# Patient Record
Sex: FEMALE | Race: WHITE | NOT HISPANIC OR LATINO | URBAN - METROPOLITAN AREA
[De-identification: names, ages, dates, MRNs, and addresses within clinical notes are randomized per-mention and may not be internally consistent; named-entity substitution may affect disease eponyms.]

---

## 2024-03-25 PROBLEM — E66.9 OBESITY, CHILDHOOD: Status: ACTIVE | Noted: 2024-03-25

## 2024-03-25 PROBLEM — F88 GLOBAL DEVELOPMENTAL DELAY: Status: ACTIVE | Noted: 2024-03-25

## 2024-03-25 PROBLEM — F41.9 ANXIETY: Status: ACTIVE | Noted: 2024-03-25

## 2024-03-25 PROBLEM — F81.9 LEARNING DIFFICULTY: Status: ACTIVE | Noted: 2024-03-25

## 2024-03-25 PROBLEM — F90.9 ADHD (ATTENTION DEFICIT HYPERACTIVITY DISORDER): Status: ACTIVE | Noted: 2024-03-25

## 2024-03-25 PROBLEM — G47.9 SLEEP DISTURBANCE: Status: ACTIVE | Noted: 2024-03-25

## 2024-03-25 PROBLEM — M21.40 PES PLANUS: Status: ACTIVE | Noted: 2024-03-25

## 2024-03-25 PROBLEM — L70.9 ACNE: Status: ACTIVE | Noted: 2024-03-25

## 2024-03-25 PROBLEM — J45.30 ASTHMA, CHRONIC, MILD PERSISTENT, UNCOMPLICATED (HHS-HCC): Status: ACTIVE | Noted: 2024-03-25

## 2024-03-26 ENCOUNTER — OFFICE VISIT (OUTPATIENT)
Dept: PEDIATRICS | Facility: CLINIC | Age: 19
End: 2024-03-26
Payer: COMMERCIAL

## 2024-03-26 VITALS
DIASTOLIC BLOOD PRESSURE: 80 MMHG | WEIGHT: 247.7 LBS | HEIGHT: 62 IN | BODY MASS INDEX: 45.58 KG/M2 | SYSTOLIC BLOOD PRESSURE: 114 MMHG | RESPIRATION RATE: 18 BRPM | TEMPERATURE: 98.1 F | HEART RATE: 95 BPM

## 2024-03-26 DIAGNOSIS — Z00.129 ENCOUNTER FOR ROUTINE CHILD HEALTH EXAMINATION WITHOUT ABNORMAL FINDINGS: Primary | ICD-10-CM

## 2024-03-26 DIAGNOSIS — Z00.00 HEALTH CARE MAINTENANCE: ICD-10-CM

## 2024-03-26 LAB
POC APPEARANCE, URINE: CLEAR
POC BILIRUBIN, URINE: NEGATIVE
POC BLOOD, URINE: NEGATIVE
POC COLOR, URINE: YELLOW
POC GLUCOSE, URINE: NEGATIVE MG/DL
POC HEMOGLOBIN: 14.6 G/DL (ref 12–16)
POC KETONES, URINE: NEGATIVE MG/DL
POC LEUKOCYTES, URINE: NEGATIVE
POC NITRITE,URINE: NEGATIVE
POC PH, URINE: 5.5 PH
POC PROTEIN, URINE: NEGATIVE MG/DL
POC SPECIFIC GRAVITY, URINE: >=1.03
POC UROBILINOGEN, URINE: 0.2 EU/DL

## 2024-03-26 PROCEDURE — 1036F TOBACCO NON-USER: CPT | Performed by: PEDIATRICS

## 2024-03-26 PROCEDURE — 96127 BRIEF EMOTIONAL/BEHAV ASSMT: CPT | Performed by: PEDIATRICS

## 2024-03-26 PROCEDURE — 81003 URINALYSIS AUTO W/O SCOPE: CPT | Performed by: PEDIATRICS

## 2024-03-26 PROCEDURE — 99395 PREV VISIT EST AGE 18-39: CPT | Performed by: PEDIATRICS

## 2024-03-26 PROCEDURE — 85018 HEMOGLOBIN: CPT | Performed by: PEDIATRICS

## 2024-03-26 NOTE — PROGRESS NOTES
Subjective   Patient ID: Capri Gracia is a 18 y.o. female who presents for Well Child (With mom). Feet are always hurting after work.   Works at [employer] from 6 am -2 pm   She never know her schedule a head of time but works full time according to her     She enjoys work     Does not drive yet but wants to     Feels anxious at times and gets worries when she disappoints people like her parents           Review of Systems    Objective   Physical Exam  Constitutional:       Appearance: Normal appearance. She is obese.   HENT:      Right Ear: Tympanic membrane normal.      Left Ear: Tympanic membrane normal.      Nose: Nose normal.      Mouth/Throat:      Pharynx: Oropharynx is clear.   Eyes:      Extraocular Movements: Extraocular movements intact.      Conjunctiva/sclera: Conjunctivae normal.      Pupils: Pupils are equal, round, and reactive to light.   Cardiovascular:      Rate and Rhythm: Regular rhythm.      Heart sounds: Normal heart sounds.   Pulmonary:      Breath sounds: Normal breath sounds.   Abdominal:      General: Abdomen is flat. Bowel sounds are normal.      Palpations: Abdomen is soft.   Musculoskeletal:      Cervical back: Neck supple.   Skin:     General: Skin is warm.   Neurological:      General: No focal deficit present.      Mental Status: She is alert.   Psychiatric:         Mood and Affect: Mood normal.         Assessment/Plan   Diagnoses and all orders for this visit:  Encounter for routine child health examination without abnormal findings  Health care maintenance  -     POCT hemoglobin manually resulted  -     POCT UA Automated manually resulted  Spent time discussing her mood and at times she feels sad mostly situational            Shantal Molina MA 03/26/24 2:43 PM

## 2024-09-11 ENCOUNTER — TELEMEDICINE (OUTPATIENT)
Dept: PEDIATRICS | Facility: CLINIC | Age: 19
End: 2024-09-11
Payer: COMMERCIAL

## 2024-09-11 DIAGNOSIS — U07.1 COVID-19 VIRUS DETECTED: Primary | ICD-10-CM

## 2024-09-11 PROCEDURE — 99213 OFFICE O/P EST LOW 20 MIN: CPT | Performed by: PEDIATRICS

## 2024-09-11 NOTE — PROGRESS NOTES
Subjective   Patient ID: Capri Gracia is a 19 y.o. female who presents for No chief complaint on file..  Today she is accompanied by alone.     1 day h/o runny nose, congestion and sore throat   No fever   No GI sx   Normal appetite   Tested positive for COVID yesterday     Mom had COVID 1st         Review of Systems    Objective   There were no vitals taken for this visit.  BSA: There is no height or weight on file to calculate BSA.  Growth percentiles: Facility age limit for growth %aliyah is 20 years. Facility age limit for growth %aliyah is 20 years.     Physical Exam  Constitutional:       Appearance: Normal appearance.         Assessment/Plan   Positive COVID URI  Cont supportive care   Does not need to quarantine  Does not need work excuse

## 2024-11-25 ENCOUNTER — APPOINTMENT (OUTPATIENT)
Dept: PEDIATRICS | Facility: CLINIC | Age: 19
End: 2024-11-25
Payer: COMMERCIAL

## 2025-02-24 ENCOUNTER — OFFICE VISIT (OUTPATIENT)
Facility: CLINIC | Age: 20
End: 2025-02-24
Payer: COMMERCIAL

## 2025-02-24 VITALS
WEIGHT: 255 LBS | TEMPERATURE: 101.1 F | HEART RATE: 128 BPM | HEIGHT: 63 IN | OXYGEN SATURATION: 95 % | BODY MASS INDEX: 45.18 KG/M2 | SYSTOLIC BLOOD PRESSURE: 109 MMHG | DIASTOLIC BLOOD PRESSURE: 75 MMHG | RESPIRATION RATE: 20 BRPM

## 2025-02-24 DIAGNOSIS — J45.40 MODERATE PERSISTENT ASTHMA WITHOUT COMPLICATION (HHS-HCC): ICD-10-CM

## 2025-02-24 DIAGNOSIS — J45.40 MODERATE PERSISTENT ASTHMA WITHOUT COMPLICATION (HHS-HCC): Primary | ICD-10-CM

## 2025-02-24 DIAGNOSIS — J11.1 INFLUENZA: ICD-10-CM

## 2025-02-24 DIAGNOSIS — R50.81 FEVER IN OTHER DISEASES: Primary | ICD-10-CM

## 2025-02-24 PROCEDURE — 99204 OFFICE O/P NEW MOD 45 MIN: CPT | Performed by: NURSE PRACTITIONER

## 2025-02-24 PROCEDURE — 1036F TOBACCO NON-USER: CPT | Performed by: NURSE PRACTITIONER

## 2025-02-24 PROCEDURE — 3008F BODY MASS INDEX DOCD: CPT | Performed by: NURSE PRACTITIONER

## 2025-02-24 RX ORDER — OSELTAMIVIR PHOSPHATE 75 MG/1
75 CAPSULE ORAL EVERY 12 HOURS
Qty: 10 CAPSULE | Refills: 0 | Status: SHIPPED | OUTPATIENT
Start: 2025-02-24 | End: 2025-03-01

## 2025-02-24 RX ORDER — FLUTICASONE PROPIONATE AND SALMETEROL 250; 50 UG/1; UG/1
1 POWDER RESPIRATORY (INHALATION) 2 TIMES DAILY
Qty: 1 EACH | Refills: 3 | Status: SHIPPED | OUTPATIENT
Start: 2025-02-24 | End: 2025-03-26

## 2025-02-24 RX ORDER — BENZONATATE 200 MG/1
200 CAPSULE ORAL 3 TIMES DAILY PRN
Qty: 30 CAPSULE | Refills: 0 | Status: SHIPPED | OUTPATIENT
Start: 2025-02-24 | End: 2025-03-26

## 2025-02-24 RX ORDER — ALBUTEROL SULFATE 90 UG/1
2 INHALANT RESPIRATORY (INHALATION) EVERY 4 HOURS PRN
Qty: 18 G | Refills: 5 | Status: SHIPPED | OUTPATIENT
Start: 2025-02-24 | End: 2026-02-24

## 2025-02-24 RX ORDER — MOMETASONE FUROATE AND FORMOTEROL FUMARATE DIHYDRATE 100; 5 UG/1; UG/1
2 AEROSOL RESPIRATORY (INHALATION) 2 TIMES DAILY
Qty: 13 G | Refills: 3 | Status: SHIPPED | OUTPATIENT
Start: 2025-02-24 | End: 2025-02-25

## 2025-02-24 RX ORDER — MONTELUKAST SODIUM 10 MG/1
10 TABLET ORAL NIGHTLY
Qty: 30 TABLET | Refills: 5 | Status: SHIPPED | OUTPATIENT
Start: 2025-02-24 | End: 2025-08-23

## 2025-02-24 RX ORDER — BENZONATATE 100 MG/1
100 CAPSULE ORAL EVERY 8 HOURS PRN
COMMUNITY
Start: 2025-02-23 | End: 2025-02-24

## 2025-02-24 ASSESSMENT — ENCOUNTER SYMPTOMS
COUGH: 1
SHORTNESS OF BREATH: 1
RHINORRHEA: 1
FEVER: 1

## 2025-02-24 NOTE — PATIENT INSTRUCTIONS
Asthma with influenza A  - if she starts wheezing will get CXR and course of steriods  - albuterol hfa 2 puffs or albuterol nebs every 4-6 hours as needed  - start ICA/LABA - dulera 100 2 puffs twice a day , rinse and spit afterwards  - tessalon Perles 200mg by mouth every 8 hours as needed  - push fluids  - avoid contact as feverish, return to work once afebrile for 24 hours   - add guaifenesin 600mg twice a day   - add tamiflu twice a day x 5 days      Snoring, hypersomnulent Stop bang 2   - re-eval once better consider     allergic rhinitis  - purchase nasal saline over the counter - use 2-3 x per day to rinse out nasal passages and keep them moist   - start fluticasone (flonase) 1 spray each nostril 1-2 x per day - remember to aim towards your ear   - cont Claritin daily at nighttime       Thank you for visiting the Pulmonary clinic today!       Return to clinic after 4-6 weeks and after PFTs, CT scan complete  or sooner if needed   Fiordaliza Wong CNP  My office number is [phone] -     Call to schedule  for radiology - CT scans/PFTs etc at  [phone]  General scheduling  [phone]     Best way to get a hold of me is to call my office --> Please do not send me follow my health messages  Any test results will be discussed at next visit -- please make sure to make a follow up appt after testing.

## 2025-02-24 NOTE — PROGRESS NOTES
Patient: Capri Gracia    [id number]  : 2005 -- AGE 19 y.o.    Provider: TIARA Valle     Location [facility]   Service Date: 2025              [facility]  New Visit Note      HISTORY OF PRESENT ILLNESS     The patient's referring provider is: No ref. provider found    HISTORY OF PRESENT ILLNESS   Capri Gracia is a 19 y.o. female who presents to a [facility] for an evaluation with concerns of Shortness of Breath. I have independently interviewed and examined the patient in the office and reviewed available records.    Initial History  She was tested with FLU And COVID yesterday at Urgent care   Her mother has influenza A     On today's visit, the patient reports she reports sore throat coughing since .  She developed a fever 100.4. She is taking ibuprofen/tylenol and dayquil and nyquil.  She is shortness of breath, difficult to fall asleep due to cough. She is expectorating yellow phlegm. She is short of breath at rest. Has a history of asthma - was admitted in  for rhinivirus - last seen by Dr. Grajeda at that point. She was  previously on albuterol.   At baseline - she is dyspneic with strenuous activity   Currently she  is unable to lay down, denies  pnd/ brenda.  Weight has been mostly stable.  Prior to illness having dry cough, c/o wheezing. Has a runny nose, and a tingling sensation in the back of his throat - claritin.  . Has no runny nose, or a tingling sensation in the back of his throat. Denies chest pain or heartburn.     Previous pulmonary history:     previously was told they have asthma as a child since 5 years old - has been admitted  Never been to pulmonary rehab. Does not recall having AECOPD requiring antibiotics or prednisone.    Inhalers/nebulized medications: albuterol and mother thinks Symbicort     Hospitalization History: Not been hospitalized over the last year for  breathing related problem.    Sleep history:    Complains of snoring, apnea, feeling tired during the day, and taking naps during the day.   STOP-BANG score of 2 low     ALLERGIES AND MEDICATIONS     ALLERGIES  Allergies   Allergen Reactions    Cefdinir Hives       MEDICATIONS  Current Outpatient Medications   Medication Sig Dispense Refill    benzocaine-menthoL 6-10 mg lozenge Place 1 lozenge into mouth between cheek and gum every 2 hours if needed.      benzonatate (Tessalon) 100 mg capsule Take 1 capsule (100 mg) by mouth every 8 hours if needed.       No current facility-administered medications for this visit.         PAST HISTORY     PAST MEDICAL HISTORY  She  has a past medical history of Abnormal findings on diagnostic imaging of heart and coronary circulation (08/18/2021), Acute upper respiratory infection, unspecified (03/02/2020), Other viral infections of unspecified site (08/18/2021), Personal history of other diseases of the respiratory system (03/02/2020), Personal history of other diseases of the respiratory system (07/07/2021), Personal history of other diseases of the respiratory system (08/18/2021), Personal history of other diseases of the respiratory system (10/09/2019), Personal history of other specified conditions (03/02/2020), Personal history of other specified conditions, and Personal history of pneumonia (recurrent) (02/15/2022).  Asthma  Acid reflux - not officially diagnosed      PAST SURGICAL HISTORY  Past Surgical History:   Procedure Laterality Date    OTHER SURGICAL HISTORY  10/02/2019    No history of surgery       IMMUNIZATION HISTORY  Immunization History   Administered Date(s) Administered    DTaP vaccine, pediatric  (INFANRIX) 2005, 2004, 2004, 08/29/2005, 12/14/2009    Flu vaccine (IIV4), preservative free *Check age/dose* 10/30/2017    HPV, Unspecified 03/11/2021, 04/29/2022, 08/30/2022    Hepatitis B vaccine, 19 yrs and under (RECOMBIVAX, ENGERIX)  2004, 2004, 2004    HiB PRP-T conjugate vaccine (HIBERIX, ACTHIB) 2004, 2004, 08/29/2005    HiB, unspecified 2004    Influenza, seasonal, injectable 2004, 2004, 09/14/2009, 11/03/2009, 01/21/2010, 10/14/2011, 11/13/2012, 11/01/2013, 10/12/2015, 10/09/2019, 10/15/2020    MMR vaccine, subcutaneous (MMR II) 05/09/2005, 12/14/2009    Meningococcal ACWY vaccine (MENVEO) 08/30/2022    Meningococcal ACWY, unspecified 07/26/2017    Pfizer Purple Cap SARS-CoV-2 06/20/2021, 07/13/2021    Pneumococcal conjugate vaccine, 13-valent (PREVNAR 13) 2004, 2004, 2004, 08/29/2005    Poliovirus vaccine, subcutaneous (IPOL) 2004, 2004, 08/29/2005, 12/14/2009    Tdap vaccine, age 7 year and older (BOOSTRIX, ADACEL) 07/26/2017    Varicella vaccine, subcutaneous (VARIVAX) 05/09/2005, 12/14/2009       SOCIAL HISTORY  She  reports that she has never smoked. She has never been exposed to tobacco smoke. She has never used smokeless tobacco. She reports that she does not drink alcohol and does not use drugs. She Patient     OCCUPATIONAL/ENVIRONMENTAL HISTORY  Currently works as: works at [employer]   DOES/DOES NOT EC: does not have known exposure to asbestos, silica, beryllium or inhaled metals.  DOES/DOES NOT EC: does not have exposure to birds or exotic animals.    FAMILY HISTORY  No family history on file.  DOES/DOES NOT EC: does have a family history of pulmonary disease. Mother is prone to pneumonia   DOES/DOES NOT EC: does have a family history of cancer. Maternal aunt had leukemia  DOES/DOES NOT EC: does not have a family history of autoimmune disorders.    RESULTS/DATA     Pulmonary Function Test Results      PFT 8/19/2021            Chest Radiograph     XR CHEST 1 VIEW 06/24/2021    Narrative  MRN: [id number]  Patient Name: NABIL ROSARIO    STUDY:  Chest dated  6/24/2021.    INDICATION:  shortness of breath    COMPARISON:  None.    ACCESSION  NUMBER(S):  [id number]    ORDERING CLINICIAN:  VALERIE SPEAR    TECHNIQUE:  AP upright portable radiograph of the chest.    FINDINGS:  The lungs are clear.  No pneumothorax or effusion is evident. The  cardiomediastinal silhouette is  not enlarged.  The soft tissues are  grossly unremarkable.    Impression  No acute cardiopulmonary process is evident.      Chest CT Scan     CT ANGIO CHEST FOR PE;  6/24/2021        INDICATION:  tachycardia, hypoxia, elevated d dimer.     COMPARISON:  None.     ACCESSION NUMBER(S):  [id number]     ORDERING CLINICIAN:  VALERIE SPEAR     TECHNIQUE:  Contiguous axial images of the chest were obtained after the  intravenous administration of  90 mL of Isovue-300. Coronal and  sagittal reformatted images were obtained from the axial images. MIPS  were also performed and reviewed and confirmed the findings of the  examination.     FINDINGS:  No axillary, mediastinal, or hilar lymphadenopathy.     The heart is normal in size. No significant pericardial effusion.     No definite evidence of acute central, main, or proximal lobar  pulmonary embolism. Evaluation for more distal emboli is  nondiagnostic secondary to patient respiratory motion.     Evaluation of the lungs is limited secondary to respiratory motion.  There are bilateral groundglass opacities in the upper lobes and also  opacities in the right middle lobe and lingula. There is also minimal  ground-glass opacities in the left lower lobe. No significant pleural  effusion. No pneumothorax.     Limited evaluation of the upper abdomen.  Hepatic steatosis.     No evidence of acute fracture of the thoracic spine.     IMPRESSION:  No definite evidence of acute central, main, or proximal lobar  pulmonary embolism. Evaluation for more distal emboli is  nondiagnostic secondary to patient respiratory motion.     Bilateral groundglass and consolidative opacities compatible with  pneumonia and highly suggestive ofCOVID-19.      Echocardiogram  "    No testing done          REVIEW OF SYSTEMS     REVIEW OF SYSTEMS  Review of Systems   Constitutional:  Positive for fever.   HENT:  Positive for congestion, postnasal drip and rhinorrhea.    Respiratory:  Positive for cough and shortness of breath.          PHYSICAL EXAM     VITAL SIGNS: /75   Pulse (!) 128   Temp (!) 38.4 °C (101.1 °F) (Temporal)   Resp 20   Ht 1.6 m (5' 3\")   Wt 116 kg (255 lb)   SpO2 95%   BMI 45.17 kg/m²      CURRENT WEIGHT: [unfilled]  BMI: [unfilled]  PREVIOUS WEIGHTS:  Wt Readings from Last 3 Encounters:   02/24/25 116 kg (255 lb)   03/26/24 112 kg (247 lb 11.2 oz) (>99%, Z= 2.50)*   08/30/22 109 kg (239 lb 9.6 oz) (>99%, Z= 2.38)*     * Growth percentiles are based on [facility] (Girls, 2-20 Years) data.       Physical Exam  Constitutional:       General: She is in acute distress.      Appearance: Normal appearance. She is ill-appearing.   HENT:      Head: Normocephalic and atraumatic.      Right Ear: External ear normal.      Left Ear: External ear normal.      Nose: Nose normal.      Mouth/Throat:      Mouth: Mucous membranes are moist.      Pharynx: Oropharynx is clear.   Eyes:      Extraocular Movements: Extraocular movements intact.      Conjunctiva/sclera: Conjunctivae normal.      Pupils: Pupils are equal, round, and reactive to light.   Cardiovascular:      Rate and Rhythm: Normal rate and regular rhythm.      Pulses: Normal pulses.      Heart sounds: Normal heart sounds.   Pulmonary:      Effort: Pulmonary effort is normal.      Breath sounds: Normal breath sounds.      Comments: Audible cough   Abdominal:      General: Bowel sounds are normal.      Palpations: Abdomen is soft.   Musculoskeletal:         General: Normal range of motion.      Cervical back: Normal range of motion and neck supple.   Skin:     General: Skin is warm and dry.   Neurological:      General: No focal deficit present.      Mental Status: She is alert and oriented to person, place, and time. Mental " status is at baseline.   Psychiatric:         Mood and Affect: Mood normal.         Behavior: Behavior normal.         Thought Content: Thought content normal.         Judgment: Judgment normal.         ASSESSMENT/PLAN     Ms. Gracia is a 19 y.o. female and  has a past medical history of Abnormal findings on diagnostic imaging of heart and coronary circulation (08/18/2021), Acute upper respiratory infection, unspecified (03/02/2020), Other viral infections of unspecified site (08/18/2021), Personal history of other diseases of the respiratory system (03/02/2020), Personal history of other diseases of the respiratory system (07/07/2021), Personal history of other diseases of the respiratory system (08/18/2021), Personal history of other diseases of the respiratory system (10/09/2019), Personal history of other specified conditions (03/02/2020), Personal history of other specified conditions, and Personal history of pneumonia (recurrent) (02/15/2022). She was referred to the [facility] for evaluation of asthma, cough and influenza     Problem List and Orders      Assessment and Plan / Recommendations:  Problem List Items Addressed This Visit    None        Asthma with influenza A  - if she starts wheezing will get CXR and course of steriods  - albuterol hfa 2 puffs or albuterol nebs every 4-6 hours as needed  - start ICA/LABA - dulera 100 2 puffs twice a day , rinse and spit afterwards  - tessalon Perles 200mg by mouth every 8 hours as needed  - push fluids  - avoid contact as feverish, return to work once afebrile for 24 hours   - add guaifenesin 600mg twice a day   - add tamiflu twice a day x 5 days      Snoring, hyper-somnulent Stop bang 2   - re-eval once better consider     allergic rhinitis  - purchase nasal saline over the counter - use 2-3 x per day to rinse out nasal passages and keep them moist   - start fluticasone (flonase) 1 spray each nostril 1-2 x per day - remember  to aim towards your ear   - cont Claritin daily at nighttime       Vaccinations  - recommend pneumonia, RSV and yearly influenza       Thank you for visiting the Pulmonary clinic today!       Return to clinic after 4-6 weeks and after PFTs, CT scan complete  or sooner if needed   Fiordaliza Wong CNP  My office number is [phone] -     Call to schedule  for radiology - CT scans/PFTs etc at  [phone]  General scheduling  [phone]     Best way to get a hold of me is to call my office --> Please do not send me follow my health messages  Any test results will be discussed at next visit -- please make sure to make a follow up appt after testing.

## 2025-02-25 RX ORDER — FLUTICASONE PROPIONATE AND SALMETEROL 113; 14 UG/1; UG/1
1 POWDER, METERED RESPIRATORY (INHALATION) 2 TIMES DAILY
Qty: 1 EACH | Refills: 3 | Status: SHIPPED | OUTPATIENT
Start: 2025-02-25 | End: 2025-03-27

## 2025-03-06 ENCOUNTER — TELEPHONE (OUTPATIENT)
Facility: CLINIC | Age: 20
End: 2025-03-06
Payer: COMMERCIAL

## 2025-03-24 ENCOUNTER — APPOINTMENT (OUTPATIENT)
Facility: CLINIC | Age: 20
End: 2025-03-24
Payer: COMMERCIAL

## 2025-04-04 NOTE — PROGRESS NOTES
Patient: Capri Gracia    [id number]  : 2005 -- AGE 19 y.o.    Provider: TIARA Valle     Location [facility]   Service Date: 4/15/2025              [facility]  New Visit Note      HISTORY OF PRESENT ILLNESS     The patient's referring provider is: No ref. provider found    HISTORY OF PRESENT ILLNESS   Capri Gracia is a 19 y.o. female who presents to a [facility] for an evaluation with concerns of Asthma. I have independently interviewed and examined the patient in the office and reviewed available records.    Initial History  She was tested with FLU And COVID yesterday at Urgent care   Her mother has influenza A     On today's visit, the patient reports she reports sore throat coughing since .  She developed a fever 100.4. She is taking ibuprofen/tylenol and dayquil and nyquil.  She is shortness of breath, difficult to fall asleep due to cough. She is expectorating yellow phlegm. She is short of breath at rest. Has a history of asthma - was admitted in  for rhinivirus - last seen by Dr. Grajeda at that point. She was  previously on albuterol.   At baseline - she is dyspneic with strenuous activity   Currently she  is unable to lay down, denies  pnd/ brenda.  Weight has been mostly stable.  Prior to illness having dry cough, c/o wheezing. Has a runny nose, and a tingling sensation in the back of his throat - claritin.  . Has no runny nose, or a tingling sensation in the back of his throat. Denies chest pain or heartburn.     Previous pulmonary history:     previously was told they have asthma as a child since 5 years old - has been admitted  Never been to pulmonary rehab. Does not recall having AECOPD requiring antibiotics or prednisone.    Inhalers/nebulized medications: albuterol and mother thinks Symbicort     Hospitalization History: Not been hospitalized over the last year for breathing  related problem.    Sleep history:    Complains of snoring, apnea, feeling tired during the day, and taking naps during the day.   STOP-BANG score of 2 low     4/15/2025      On today's visit, the patient reports feeling better. She is using rescue inhaler a couple times a month. Denies coughing, a little wheezing. No Phlegm production  No fever or chills/   No Shortness of breath. NO runny nose or PND   Using her ICS/LABA and rinsing afterwards   ACT 20     ALLERGIES AND MEDICATIONS     ALLERGIES  Allergies   Allergen Reactions    Cefdinir Hives       MEDICATIONS  Current Outpatient Medications   Medication Sig Dispense Refill    albuterol (Ventolin HFA) 90 mcg/actuation inhaler Inhale 2 puffs every 4 hours if needed for wheezing or shortness of breath. 18 g 5    benzocaine-menthoL 6-10 mg lozenge Place 1 lozenge into mouth between cheek and gum every 2 hours if needed.      montelukast (Singulair) 10 mg tablet Take 1 tablet (10 mg) by mouth once daily at bedtime. 30 tablet 5    fluticasone propion-salmeteroL (AirDuo RespiClick) 113-14 mcg/actuation inhaler Inhale 1 puff 2 times a day. 1 each 3    fluticasone propion-salmeteroL (Wixela Inhub) 250-50 mcg/dose diskus inhaler Inhale 1 puff 2 times a day. Rinse mouth with water after use to reduce aftertaste and incidence of candidiasis. Do not swallow. 1 each 3     No current facility-administered medications for this visit.         PAST HISTORY     PAST MEDICAL HISTORY  She  has a past medical history of Abnormal findings on diagnostic imaging of heart and coronary circulation (08/18/2021), Acute upper respiratory infection, unspecified (03/02/2020), Other viral infections of unspecified site (08/18/2021), Personal history of other diseases of the respiratory system (03/02/2020), Personal history of other diseases of the respiratory system (07/07/2021), Personal history of other diseases of the respiratory system (08/18/2021), Personal history of other diseases of  the respiratory system (10/09/2019), Personal history of other specified conditions (03/02/2020), Personal history of other specified conditions, and Personal history of pneumonia (recurrent) (02/15/2022).  Asthma  Acid reflux - not officially diagnosed      PAST SURGICAL HISTORY  Past Surgical History:   Procedure Laterality Date    OTHER SURGICAL HISTORY  10/02/2019    No history of surgery       IMMUNIZATION HISTORY  Immunization History   Administered Date(s) Administered    DTaP vaccine, pediatric  (INFANRIX) 2005, 2004, 2004, 08/29/2005, 12/14/2009    Flu vaccine (IIV4), preservative free *Check age/dose* 10/30/2017    HPV, Unspecified 03/11/2021, 04/29/2022, 08/30/2022    Hepatitis B vaccine, 19 yrs and under (RECOMBIVAX, ENGERIX) 2004, 2004, 2004    HiB PRP-T conjugate vaccine (HIBERIX, ACTHIB) 2004, 2004, 08/29/2005    HiB, unspecified 2004    Influenza, seasonal, injectable 2004, 2004, 09/14/2009, 11/03/2009, 01/21/2010, 10/14/2011, 11/13/2012, 11/01/2013, 10/12/2015, 10/09/2019, 10/15/2020    MMR vaccine, subcutaneous (MMR II) 05/09/2005, 12/14/2009    Meningococcal ACWY vaccine (MENVEO) 08/30/2022    Meningococcal ACWY, unspecified 07/26/2017    Pfizer Purple Cap SARS-CoV-2 06/20/2021, 07/13/2021    Pneumococcal conjugate vaccine, 13-valent (PREVNAR 13) 2004, 2004, 2004, 08/29/2005    Poliovirus vaccine, subcutaneous (IPOL) 2004, 2004, 08/29/2005, 12/14/2009    Tdap vaccine, age 7 year and older (BOOSTRIX, ADACEL) 07/26/2017    Varicella vaccine, subcutaneous (VARIVAX) 05/09/2005, 12/14/2009       SOCIAL HISTORY  She  reports that she has never smoked. She has never been exposed to tobacco smoke. She has never used smokeless tobacco. She reports that she does not drink alcohol and does not use drugs. She Patient     OCCUPATIONAL/ENVIRONMENTAL HISTORY  Currently works as: works at [employer]   DOES/DOES NOT  EC: does not have known exposure to asbestos, silica, beryllium or inhaled metals.  DOES/DOES NOT EC: does not have exposure to birds or exotic animals.    FAMILY HISTORY  No family history on file.  DOES/DOES NOT EC: does have a family history of pulmonary disease. Mother is prone to pneumonia   DOES/DOES NOT EC: does have a family history of cancer. Maternal aunt had leukemia  DOES/DOES NOT EC: does not have a family history of autoimmune disorders.    RESULTS/DATA     Pulmonary Function Test Results      PFT 8/19/2021            Chest Radiograph     XR CHEST 1 VIEW 06/24/2021    Narrative  MRN: [id number]  Patient Name: NABIL ROSARIO    STUDY:  Chest dated  6/24/2021.    INDICATION:  shortness of breath    COMPARISON:  None.    ACCESSION NUMBER(S):  [id number]    ORDERING CLINICIAN:  VALERIE SPEAR    TECHNIQUE:  AP upright portable radiograph of the chest.    FINDINGS:  The lungs are clear.  No pneumothorax or effusion is evident. The  cardiomediastinal silhouette is  not enlarged.  The soft tissues are  grossly unremarkable.    Impression  No acute cardiopulmonary process is evident.      Chest CT Scan     CT ANGIO CHEST FOR PE;  6/24/2021        INDICATION:  tachycardia, hypoxia, elevated d dimer.     COMPARISON:  None.     ACCESSION NUMBER(S):  [id number]     ORDERING CLINICIAN:  VALERIE SPEAR     TECHNIQUE:  Contiguous axial images of the chest were obtained after the  intravenous administration of  90 mL of Isovue-300. Coronal and  sagittal reformatted images were obtained from the axial images. MIPS  were also performed and reviewed and confirmed the findings of the  examination.     FINDINGS:  No axillary, mediastinal, or hilar lymphadenopathy.     The heart is normal in size. No significant pericardial effusion.     No definite evidence of acute central, main, or proximal lobar  pulmonary embolism. Evaluation for more distal emboli is  nondiagnostic secondary to patient respiratory motion.     Evaluation of  the lungs is limited secondary to respiratory motion.  There are bilateral groundglass opacities in the upper lobes and also  opacities in the right middle lobe and lingula. There is also minimal  ground-glass opacities in the left lower lobe. No significant pleural  effusion. No pneumothorax.     Limited evaluation of the upper abdomen.  Hepatic steatosis.     No evidence of acute fracture of the thoracic spine.     IMPRESSION:  No definite evidence of acute central, main, or proximal lobar  pulmonary embolism. Evaluation for more distal emboli is  nondiagnostic secondary to patient respiratory motion.     Bilateral groundglass and consolidative opacities compatible with  pneumonia and highly suggestive ofCOVID-19.      Echocardiogram     Echocardiogram 6/25/2021       Complete echocardiogram examination with two-dimensional imaging, M-mode, color-Doppler, and spectral Doppler was performed.      1. Technically difficult and limited study.   2. The left ventricle does not appear enlarged and the systolic function is qualitatively hyperdynamic.   3. Limited coronary artery imaging. The proximal right coronary artery does not appear enlarged. Left coronary artery was not well seen.   4. The right ventricle does not appear enlarged. Limited imaging of the RV free wall with inability to comment on systolic function.   5. Atrial septum not adequately visualized.   6. The pulmonary veins were not well visualized.   7. No suprasternal notch imaging.   8. No pericardial effusion.     Segmental Anatomy, Cardiac Position and Situs:  . The heart position is within the left hemithorax.  Systemic Veins:  The systemic veins were not evaluated.  Pulmonary Veins:  The pulmonary veins were not well visualized.  Atria:     Atrial septum not adequately visualized. The right atrium is normal in size. The left atrium is normal in size.  Mitral Valve:  The mitral valve is normal. There is no mitral valve regurgitation.  Tricuspid  Valve:  The tricuspid valve is not well visualized.  Left Ventricle:     The left ventricle does not appear enlarged and the systolic function is qualitatively hyperdynamic.  Right Ventricle:  The right ventricle does not appear enlarged. Limited imaging of the RV free wall with inability to comment on systolic function.  Ventricular Septum:  No ventricular septal defect is seen.  Aortic Valve:  The aortic valve is normal. Normal aortic valve Doppler pattern. There is no aortic valve regurgitation. Small hyperechoic area on the non-coronary cusp of the aortic valve.  Left Ventricular Outflow Tract:  There is no left ventricular outflow tract obstruction.  Pulmonary Valve:  The pulmonic valve was not well visualized. There is no pulmonary valve stenosis. There is trace pulmonary valve regurgitation.  Right Ventricular Outflow Tract:  There is no right ventricular outflow tract obstruction.  Aorta:  The aortic root is normal in size. No suprasternal notch imaging. The ascending aorta is not well visualized.  Pulmonary Arteries:     The pulmonary arteries were not well visualized.  Ductus Arteriosus:  The ductus arteriosus was not evaluated.  Coronary Arteries:  The right coronary artery origin appears normal. Limited coronary artery imaging. The proximal right coronary artery does not appear enlarged. Left coronary artery was not well seen.  Pericardium:  There is no pericardial effusion.  Other:  Technically difficult and limited study.     2D measurements                Z-score  Aortic Valve Annulus:  1.95 cm -1.75  Aorta Root s:          2.55 cm -1.63  Right Coronary Artery:  2.6 mm -1.45        Aorta-Aortic Valve Doppler  Peak velocity: 1.06 m/sec  Peak gradient: 4.46 mmHg        Pulmonary Valve Doppler  Peak velocity: 1.01 m/sec  Peak gradient: 4.07 mmHg        Pulmonary Arteries Doppler  LPA peak velocity: 0.77 m/s  LPA peak gradient: 2.37 mmHg  RPA peak velocity: 0.77 m/s  RPA peak gradient: 2.35 mmHg       "      REVIEW OF SYSTEMS     REVIEW OF SYSTEMS  Review of Systems   Constitutional:  Negative for fever.   HENT:  Negative for congestion, postnasal drip and rhinorrhea.    Respiratory:  Negative for cough and shortness of breath.    All other systems reviewed and are negative.        PHYSICAL EXAM     VITAL SIGNS: /80   Pulse 87   Resp 18   Ht 1.6 m (5' 3\")   Wt 116 kg (255 lb 3.2 oz)   LMP 04/08/2025 (Approximate)   SpO2 96%   BMI 45.21 kg/m²      CURRENT WEIGHT: [unfilled]  BMI: [unfilled]  PREVIOUS WEIGHTS:  Wt Readings from Last 3 Encounters:   04/15/25 116 kg (255 lb 3.2 oz)   02/24/25 116 kg (255 lb)   03/26/24 112 kg (247 lb 11.2 oz) (>99%, Z= 2.50)*     * Growth percentiles are based on CDC (Girls, 2-20 Years) data.       Physical Exam  Constitutional:       General: She is not in acute distress.     Appearance: Normal appearance. She is not ill-appearing.   HENT:      Head: Normocephalic and atraumatic.      Right Ear: External ear normal.      Left Ear: External ear normal.      Nose: Nose normal.      Mouth/Throat:      Mouth: Mucous membranes are moist.      Pharynx: Oropharynx is clear.   Eyes:      Extraocular Movements: Extraocular movements intact.      Conjunctiva/sclera: Conjunctivae normal.      Pupils: Pupils are equal, round, and reactive to light.   Cardiovascular:      Rate and Rhythm: Normal rate and regular rhythm.      Pulses: Normal pulses.      Heart sounds: Normal heart sounds.   Pulmonary:      Effort: Pulmonary effort is normal.      Breath sounds: Normal breath sounds.      Comments: Audible cough   Abdominal:      General: Bowel sounds are normal.      Palpations: Abdomen is soft.   Musculoskeletal:         General: Normal range of motion.      Cervical back: Normal range of motion and neck supple.   Skin:     General: Skin is warm and dry.   Neurological:      General: No focal deficit present.      Mental Status: She is alert and oriented to person, place, and time. Mental " status is at baseline.   Psychiatric:         Mood and Affect: Mood normal.         Behavior: Behavior normal.         Thought Content: Thought content normal.         Judgment: Judgment normal.         ASSESSMENT/PLAN     Ms. Gracia is a 19 y.o. female and  has a past medical history of Abnormal findings on diagnostic imaging of heart and coronary circulation (08/18/2021), Acute upper respiratory infection, unspecified (03/02/2020), Other viral infections of unspecified site (08/18/2021), Personal history of other diseases of the respiratory system (03/02/2020), Personal history of other diseases of the respiratory system (07/07/2021), Personal history of other diseases of the respiratory system (08/18/2021), Personal history of other diseases of the respiratory system (10/09/2019), Personal history of other specified conditions (03/02/2020), Personal history of other specified conditions, and Personal history of pneumonia (recurrent) (02/15/2022). She was referred to the [facility] for evaluation of asthma, cough and influenza     Problem List and Orders      Assessment and Plan / Recommendations:  Problem List Items Addressed This Visit    None        Asthma    - recent with influenza A now resolved   - albuterol hfa 2 puffs or albuterol nebs every 4-6 hours as needed  - start ICA/LABA - dulera 100 2 puffs twice a day , rinse and spit afterwards  - cont singulair       Snoring, hyper-somnulent Stop bang 2   - re-eval once better consider   - feeling better will post pone at this time     allergic rhinitis  - purchase nasal saline over the counter - use 2-3 x per day to rinse out nasal passages and keep them moist   - start fluticasone (flonase) 1 spray each nostril 1-2 x per day - remember to aim towards your ear   - cont Claritin daily at nighttime       Vaccinations  - recommend pneumonia, RSV and yearly influenza       Thank you for visiting the Pulmonary clinic today!        Return to clinic after 12 weeks and   or sooner if needed   Fiordaliza Wong CNP  My office number is [phone] -     Call to schedule  for radiology - CT scans/PFTs etc at  [phone]  General scheduling  [phone]     Best way to get a hold of me is to call my office --> Please do not send me follow my health messages  Any test results will be discussed at next visit -- please make sure to make a follow up appt after testing.

## 2025-04-15 ENCOUNTER — APPOINTMENT (OUTPATIENT)
Facility: CLINIC | Age: 20
End: 2025-04-15
Payer: COMMERCIAL

## 2025-04-15 VITALS
DIASTOLIC BLOOD PRESSURE: 80 MMHG | OXYGEN SATURATION: 96 % | HEIGHT: 63 IN | WEIGHT: 255.2 LBS | SYSTOLIC BLOOD PRESSURE: 123 MMHG | RESPIRATION RATE: 18 BRPM | HEART RATE: 87 BPM | BODY MASS INDEX: 45.22 KG/M2

## 2025-04-15 DIAGNOSIS — J45.40 MODERATE PERSISTENT ASTHMA WITHOUT COMPLICATION (HHS-HCC): Primary | ICD-10-CM

## 2025-04-15 PROCEDURE — 3008F BODY MASS INDEX DOCD: CPT | Performed by: NURSE PRACTITIONER

## 2025-04-15 PROCEDURE — 1036F TOBACCO NON-USER: CPT | Performed by: NURSE PRACTITIONER

## 2025-04-15 PROCEDURE — 99214 OFFICE O/P EST MOD 30 MIN: CPT | Performed by: NURSE PRACTITIONER

## 2025-04-15 ASSESSMENT — ASTHMA QUESTIONNAIRES
QUESTION_4 LAST FOUR WEEKS HOW OFTEN HAVE YOU USED YOUR RESCUE INHALER OR NEBULIZER MEDICATION (SUCH AS ALBUTEROL): ONCE A WEEK OR LESS
ACT_TOTALSCORE: 20
QUESTION_5 LAST FOUR WEEKS HOW WOULD YOU RATE YOUR ASTHMA CONTROL: SOMEWHAT CONTROLLED
QUESTION_3 LAST FOUR WEEKS HOW OFTEN DID YOUR ASTHMA SYMPTOMS (WHEEZING, COUGHING, SHORTNESS OF BREATH, CHEST TIGHTNESS OR PAIN) WAKE YOU UP AT NIGHT OR EARLIER THAN USUAL IN THE MORNING: ONCE OR TWICE
QUESTION_1 LAST FOUR WEEKS HOW MUCH OF THE TIME DID YOUR ASTHMA KEEP YOU FROM GETTING AS MUCH DONE AT WORK, SCHOOL OR AT HOME: A LITTLE OF THE TIME
QUESTION_2 LAST FOUR WEEKS HOW OFTEN HAVE YOU HAD SHORTNESS OF BREATH: NOT AT ALL

## 2025-04-15 ASSESSMENT — ENCOUNTER SYMPTOMS
COUGH: 0
FEVER: 0
SHORTNESS OF BREATH: 0
RHINORRHEA: 0

## 2025-04-15 ASSESSMENT — COLUMBIA-SUICIDE SEVERITY RATING SCALE - C-SSRS
2. HAVE YOU ACTUALLY HAD ANY THOUGHTS OF KILLING YOURSELF?: NO
6. HAVE YOU EVER DONE ANYTHING, STARTED TO DO ANYTHING, OR PREPARED TO DO ANYTHING TO END YOUR LIFE?: NO
1. IN THE PAST MONTH, HAVE YOU WISHED YOU WERE DEAD OR WISHED YOU COULD GO TO SLEEP AND NOT WAKE UP?: NO

## 2025-04-15 ASSESSMENT — PATIENT HEALTH QUESTIONNAIRE - PHQ9
1. LITTLE INTEREST OR PLEASURE IN DOING THINGS: NOT AT ALL
SUM OF ALL RESPONSES TO PHQ9 QUESTIONS 1 AND 2: 0
2. FEELING DOWN, DEPRESSED OR HOPELESS: NOT AT ALL

## 2025-04-15 NOTE — PATIENT INSTRUCTIONS
Asthma    - recent with influenza A now resolved   - albuterol hfa 2 puffs or albuterol nebs every 4-6 hours as needed  - start ICA/LABA - dulera 100 2 puffs twice a day , rinse and spit afterwards  - cont singulair       Snoring, hyper-somnulent Stop bang 2   - re-eval once better consider   - feeling better will post pone at this time     allergic rhinitis  - purchase nasal saline over the counter - use 2-3 x per day to rinse out nasal passages and keep them moist   - start fluticasone (flonase) 1 spray each nostril 1-2 x per day - remember to aim towards your ear   - cont Claritin daily at nighttime       Vaccinations  - recommend pneumonia, RSV and yearly influenza       Thank you for visiting the Pulmonary clinic today!       Return to clinic after 12 weeks and   or sooner if needed   Fiordaliza Wong CNP  My office number is [phone] -     Call to schedule  for radiology - CT scans/PFTs etc at  [phone]  General scheduling  [phone]     Best way to get a hold of me is to call my office --> Please do not send me follow my health messages  Any test results will be discussed at next visit -- please make sure to make a follow up appt after testing.  
intact

## 2025-06-28 DIAGNOSIS — J45.40 MODERATE PERSISTENT ASTHMA WITHOUT COMPLICATION (HHS-HCC): ICD-10-CM

## 2025-06-30 RX ORDER — MONTELUKAST SODIUM 10 MG/1
10 TABLET ORAL NIGHTLY
Qty: 90 TABLET | Refills: 1 | Status: SHIPPED | OUTPATIENT
Start: 2025-06-30

## 2025-07-15 ENCOUNTER — APPOINTMENT (OUTPATIENT)
Facility: CLINIC | Age: 20
End: 2025-07-15
Payer: COMMERCIAL